# Patient Record
Sex: FEMALE | Race: BLACK OR AFRICAN AMERICAN | NOT HISPANIC OR LATINO | Employment: FULL TIME | URBAN - METROPOLITAN AREA
[De-identification: names, ages, dates, MRNs, and addresses within clinical notes are randomized per-mention and may not be internally consistent; named-entity substitution may affect disease eponyms.]

---

## 2022-12-01 ENCOUNTER — THERAPY VISIT (OUTPATIENT)
Dept: PHYSICAL THERAPY | Facility: CLINIC | Age: 52
End: 2022-12-01
Payer: OTHER MISCELLANEOUS

## 2022-12-01 DIAGNOSIS — M54.50 ACUTE RIGHT-SIDED LOW BACK PAIN: Primary | ICD-10-CM

## 2022-12-01 PROCEDURE — 97530 THERAPEUTIC ACTIVITIES: CPT | Mod: GP | Performed by: PHYSICAL THERAPIST

## 2022-12-01 PROCEDURE — 97161 PT EVAL LOW COMPLEX 20 MIN: CPT | Mod: GP | Performed by: PHYSICAL THERAPIST

## 2022-12-01 PROCEDURE — 97112 NEUROMUSCULAR REEDUCATION: CPT | Mod: GP | Performed by: PHYSICAL THERAPIST

## 2022-12-01 NOTE — PROGRESS NOTES
[facility] Initial Evaluation -- Lumbar    Date: December 1, 2022  Lorie Lewis is a 52 year old female with a Lumbar condition.   Referral:   Work mechanical stresses:   -   Employment status:  Not currently working  Leisure mechanical stresses: Not a regular exerciser  Functional disability score (ALEX/STarT Back):  See Flowsheet  VAS score (0-10): 7/10  Patient goals/expectations:  Be able to return to work.    HISTORY:    Present symptoms: R LBP with radiation to buttock, lateral thigh and lower leg into foot.  Pain quality (sharp/shooting/stabbing/aching/burning/cramping):  Throbbing   Paresthesia (yes/no):  Numbness R foot - Int    Present since (onset date):  10-.     Symptoms (improving/unchanging/worsening):  unchanging.     Symptoms commenced as a result of:  Fell at work - slipped on wet floor    Condition occurred in the following environment:   work     Symptoms at onset (back/thigh/leg): Back and Leg,  Constant symptoms (back/thigh/leg): Back and Leg  Intermittent symptoms (back/thigh/leg): Numbness    Symptoms are made worse with the following: Always Bending, Always Sitting, Always Rising, Always Walking and Time of day - No effect   Symptoms are made better with the following: Pain meds and ice    Disturbed sleep (yes/no):  Yes - pain with rolling over - 3-4 x/night Sleeping postures (prone/sup/side R/L):     Previous episodes (0/1-5/6-10/11+): 1 Year of first episode:     Previous history: May 2022 - fell landing on R side - did not have any pain LB or leg prior to current fall  Previous treatments: PT      Specific Questions:  Cough/Sneeze/Strain (pos/neg): pos  Bowel/Bladder (normal/abnormal): normal  Gait (normal/abnormal): normal  Medications (nil/NSAIDS/analg/steroids/anticoag/other):  Narcotics/Opiods  Medical allergies:  NKA  General health (excellent/good/fair/poor):  Excellent  Pertinent medical history:  None  Imaging  (None/Xray/MRI/Other):  None with recent fall  Recent or major surgery (yes/no):  no  Night pain (yes/no): yes with changing position in bed  Accidents (yes/no): no  Unexplained weight loss (yes/no): no  Barriers at home: none  Other red flags: none    EXAMINATION    Posture:   Sitting (good/fair/poor): poor  Standing (good/fair/poor):Fair-good  Lordosis (red/acc/normal): incr  Correction of posture (better/worse/no effect): some decr in pain    Lateral Shift (right/left/nil): nil  Relevant (yes/no):    Other Observations: Pt Ambulates very slowly with stiff legs.    Neurological:    Motor deficit:  NA  Reflexes:  NA  Sensory deficit:  NA  Dural signs:  Seated SLR with ankle DF:  R (+), L (-)    Movement Loss:   Royal Mod Min Nil Pain   Flexion  X   To knees (+++)   Extension X    R LB and buttock   Side Gliding R  X   R  LB and buttock   Side Gliding L  X   R LB and buttock     Test Movements:   During: produces, abolishes, increases, decreases, no effect, centralizing, peripheralizing   After: better, worse, no better, no worse, no effect, centralized, peripheralized    Pretest symptoms standing: Pain R LB, buttock and R leg to foot   Symptoms During Symptoms After ROM increased ROM decreased No Effect   FIS Increases    Worse         Rep FIS        EIS Increases    No Worse         Rep EIS Increases  R LB and buttock  No Worse      X     Static Tests:  Sitting slouched:  NA   Sitting erect:  NA  Standing slouched: NA  Standing erect:  NA  Lying prone in extension:  NA Long sitting:  NA    Other Tests:     Provisional Classification:  Derangement - Asymmetrical, unilateral, symptoms below knee    Principle of Management:  Education:  Posture - Neutral Spine, use of L-roll, affect of posture on spine/pain, no couch, recliner, or propping up on pillows in bed, specificity of exer, centralisation/peripheralisation, and HEP   Equipment provided:  None - Recommended using rolled towel for L-Roll whenever  sitting.  Mechanical therapy (Y/N):  Y   Extension principle:  Extension In Standing (back against counter) x10, 5x/day  Lateral Principle:    Flexion principle:    Other:      ASSESSMENT/PLAN:    Patient is a 52 year old female with lumbar complaints.    Patient has the following significant findings with corresponding treatment plan.                Diagnosis 1:  R LB and leg pain  Pain -  hot/cold therapy, manual therapy, self management, education, directional preference exercise and home program  Decreased ROM/flexibility - manual therapy, therapeutic exercise and home program  Decreased strength - therapeutic exercise, therapeutic activities and home program  Decreased function - therapeutic activities and home program  Impaired posture - neuro re-education and home program    Therapy Evaluation Codes:   1) History comprised of:   Personal factors that impact the plan of care:      Language.    Comorbidity factors that impact the plan of care are:      None.     Medications impacting care: Pain.  2) Examination of Body Systems comprised of:   Body structures and functions that impact the plan of care:      Lumbar spine.   Activity limitations that impact the plan of care are:      Bending, Sitting, Walking and rising from sitting.  3) Clinical presentation characteristics are:   Stable/Uncomplicated.  4) Decision-Making    Low complexity using standardized patient assessment instrument and/or measureable assessment of functional outcome.  Cumulative Therapy Evaluation is: Low complexity.    Previous and current functional limitations:  (See Goal Flow Sheet for this information)    Short term and Long term goals: (See Goal Flow Sheet for this information)     Communication ability:  Patient appears to be able to clearly communicate and understand verbal and written communication and follow directions correctly.  Treatment Explanation - The following has been discussed with the patient:   RX ordered/plan of  care  Anticipated outcomes  Possible risks and side effects  This patient would benefit from PT intervention to resume normal activities.   Rehab potential is good.    Frequency:  1 X week, once daily  Duration:  for 8 weeks  Discharge Plan:  Achieve all LTG.  Independent in home treatment program.  Reach maximal therapeutic benefit.    Please refer to the daily flowsheet for treatment today, total treatment time and time spent performing 1:1 timed codes.

## 2022-12-01 NOTE — LETTER
[facility]  [address] [place] [address]  [address]   [place] [address]  [phone]    2022    Re: Lorie Lewis   :   1970  MRN:  [id number]   REFERRING PHYSICIAN:   July [facility]    Date of Initial Evaluation:  2022  Visits:  Rxs Used: 1  Reason for Referral:  Acute right-sided low back pain    EVALUATION SUMMARY    [facility] Initial Evaluation -- Lumbar    Date: 2022  Lorie Lewis is a 52 year old female with a Lumbar condition.   Referral:   Work mechanical stresses:   -   Employment status:  Not currently working  Leisure mechanical stresses: Not a regular exerciser  Functional disability score (ALEX/STarT Back):  See Flowsheet  VAS score (0-10): 7/10  Patient goals/expectations:  Be able to return to work.    HISTORY:    Present symptoms: R LBP with radiation to buttock, lateral thigh and lower leg into foot.  Pain quality (sharp/shooting/stabbing/aching/burning/cramping):  Throbbing   Paresthesia (yes/no):  Numbness R foot - Int  Present since (onset date):  10-.     Symptoms (improving/unchanging/worsening):  unchanging.   Symptoms commenced as a result of:  Fell at work - slipped on wet floor    Condition occurred in the following environment:   work   Symptoms at onset (back/thigh/leg): Back and Leg,  Constant symptoms (back/thigh/leg): Back and Leg  Intermittent symptoms (back/thigh/leg): Numbness  Symptoms are made worse with the following: Always Bending, Always Sitting, Always Rising, Always Walking and Time of day - No effect   Symptoms are made better with the following: Pain meds and ice  Disturbed sleep (yes/no):  Yes - pain with rolling over - 3-4 x/night Sleeping postures (prone/sup/side R/L):   Previous episodes (0/1-5/6-10/11+): 1 Year of first episode:   Previous history: May 2022 - fell landing on R  side - did not have any pain LB or leg prior to current fall  Previous treatments: PT  Re: Lorie Lewis   :   1970    Specific Questions:  Cough/Sneeze/Strain (pos/neg): pos  Bowel/Bladder (normal/abnormal): normal  Gait (normal/abnormal): normal  Medications (nil/NSAIDS/analg/steroids/anticoag/other):  Narcotics/Opiods  Medical allergies:  NKA  General health (excellent/good/fair/poor):  Excellent  Pertinent medical history:  None  Imaging (None/Xray/MRI/Other):  None with recent fall  Recent or major surgery (yes/no):  no  Night pain (yes/no): yes with changing position in bed  Accidents (yes/no): no  Unexplained weight loss (yes/no): no  Barriers at home: none  Other red flags: none    EXAMINATION    Posture:   Sitting (good/fair/poor): poor  Standing (good/fair/poor):Fair-good  Lordosis (red/acc/normal): incr  Correction of posture (better/worse/no effect): some decr in pain    Lateral Shift (right/left/nil): nil  Relevant (yes/no):    Other Observations: Pt Ambulates very slowly with stiff legs.    Neurological:    Motor deficit:  NA  Reflexes:  NA  Sensory deficit:  NA  Dural signs:  Seated SLR with ankle DF:  R (+), L (-)    Movement Loss:   Royal Mod Min Nil Pain   Flexion  X   To knees (+++)   Extension X    R LB and buttock   Side Gliding R  X   R  LB and buttock   Side Gliding L  X   R LB and buttock     Test Movements:   During: produces, abolishes, increases, decreases, no effect, centralizing, peripheralizing   After: better, worse, no better, no worse, no effect, centralized, peripheralized              Re: Lorie Lewis   :   1970    Pretest symptoms standing: Pain R LB, buttock and R leg to foot   Symptoms During Symptoms After ROM increased ROM decreased No Effect   FIS Increases    Worse         Rep FIS        EIS Increases    No Worse         Rep EIS Increases  R LB and buttock  No Worse      X     Static Tests:  Sitting slouched:  NA   Sitting erect:  NA  Standing slouched:  NA  Standing erect:  NA  Lying prone in extension:  NA Long sitting:  NA    Other Tests:     Provisional Classification:  Derangement - Asymmetrical, unilateral, symptoms below knee    Principle of Management:  Education:  Posture - Neutral Spine, use of L-roll, affect of posture on spine/pain, no couch, recliner, or propping up on pillows in bed, specificity of exer, centralisation/peripheralisation, and HEP   Equipment provided:  None - Recommended using rolled towel for L-Roll whenever sitting.  Mechanical therapy (Y/N):  Y   Extension principle:  Extension In Standing (back against counter) x10, 5x/day  Lateral Principle:    Flexion principle:    Other:      ASSESSMENT/PLAN:    Patient is a 52 year old female with lumbar complaints.    Patient has the following significant findings with corresponding treatment plan.                Diagnosis 1:  R LB and leg pain  Pain -  hot/cold therapy, manual therapy, self management, education, directional preference exercise and home program  Decreased ROM/flexibility - manual therapy, therapeutic exercise and home program  Decreased strength - therapeutic exercise, therapeutic activities and home program  Decreased function - therapeutic activities and home program  Impaired posture - neuro re-education and home program    Therapy Evaluation Codes:   1) History comprised of:   Personal factors that impact the plan of care:     Language.    Comorbidity factors that impact the plan of care are:     None.     Medications impacting care: Pain.    Re: Lorie Lewis   :   1970    2) Examination of Body Systems comprised of:   Body structures and functions that impact the plan of care:     Lumbar spine.   Activity limitations that impact the plan of care are:     Bending, Sitting, Walking and rising from sitting.  3) Clinical presentation characteristics are:  Stable/Uncomplicated.  4) Decision-Making   Low complexity using standardized patient assessment instrument  and/or  measureable assessment of functional outcome.    Cumulative Therapy Evaluation is: Low complexity.    Previous and current functional limitations:  (See Goal Flow Sheet for this information)    Short term and Long term goals: (See Goal Flow Sheet for this information)     Communication ability:  Patient appears to be able to clearly communicate and understand verbal and written communication and follow directions correctly.  Treatment Explanation - The following has been discussed with the patient:   RX ordered/plan of care  Anticipated outcomes  Possible risks and side effects  This patient would benefit from PT intervention to resume normal activities.   Rehab potential is good.    Frequency:  1 X week, once daily  Duration:  for 8 weeks  Discharge Plan:  Achieve all LTG.  Independent in home treatment program.  Reach maximal therapeutic benefit.    Thank you for your referral.    INQUIRIES  Therapist: Rica Rivera PT  [address] [address]  Phone: [phone]  Fax: [phone]

## 2022-12-04 PROBLEM — M54.50 ACUTE RIGHT-SIDED LOW BACK PAIN: Status: ACTIVE | Noted: 2022-12-04

## 2022-12-05 ENCOUNTER — THERAPY VISIT (OUTPATIENT)
Dept: PHYSICAL THERAPY | Facility: CLINIC | Age: 52
End: 2022-12-05
Payer: OTHER MISCELLANEOUS

## 2022-12-05 DIAGNOSIS — M54.50 ACUTE RIGHT-SIDED LOW BACK PAIN: Primary | ICD-10-CM

## 2022-12-05 PROCEDURE — 97530 THERAPEUTIC ACTIVITIES: CPT | Mod: GP | Performed by: PHYSICAL THERAPIST

## 2022-12-05 PROCEDURE — 97110 THERAPEUTIC EXERCISES: CPT | Mod: GP | Performed by: PHYSICAL THERAPIST

## 2022-12-12 ENCOUNTER — THERAPY VISIT (OUTPATIENT)
Dept: PHYSICAL THERAPY | Facility: CLINIC | Age: 52
End: 2022-12-12
Payer: OTHER MISCELLANEOUS

## 2022-12-12 ENCOUNTER — TRANSCRIBE ORDERS (OUTPATIENT)
Dept: OTHER | Age: 52
End: 2022-12-12

## 2022-12-12 DIAGNOSIS — M54.50 ACUTE RIGHT-SIDED LOW BACK PAIN: Primary | ICD-10-CM

## 2022-12-12 PROCEDURE — 97140 MANUAL THERAPY 1/> REGIONS: CPT | Mod: GP

## 2022-12-12 PROCEDURE — 97110 THERAPEUTIC EXERCISES: CPT | Mod: GP

## 2022-12-20 ENCOUNTER — THERAPY VISIT (OUTPATIENT)
Dept: PHYSICAL THERAPY | Facility: CLINIC | Age: 52
End: 2022-12-20
Payer: OTHER MISCELLANEOUS

## 2022-12-20 DIAGNOSIS — M54.50 ACUTE RIGHT-SIDED LOW BACK PAIN: Primary | ICD-10-CM

## 2022-12-20 PROCEDURE — 97110 THERAPEUTIC EXERCISES: CPT | Mod: GP | Performed by: PHYSICAL THERAPIST

## 2022-12-20 PROCEDURE — 97530 THERAPEUTIC ACTIVITIES: CPT | Mod: GP | Performed by: PHYSICAL THERAPIST

## 2022-12-29 ENCOUNTER — THERAPY VISIT (OUTPATIENT)
Dept: PHYSICAL THERAPY | Facility: CLINIC | Age: 52
End: 2022-12-29
Payer: OTHER MISCELLANEOUS

## 2022-12-29 DIAGNOSIS — M54.50 ACUTE RIGHT-SIDED LOW BACK PAIN: Primary | ICD-10-CM

## 2022-12-29 PROCEDURE — 97110 THERAPEUTIC EXERCISES: CPT | Mod: GP | Performed by: PHYSICAL THERAPIST

## 2022-12-29 PROCEDURE — 97530 THERAPEUTIC ACTIVITIES: CPT | Mod: GP | Performed by: PHYSICAL THERAPIST

## 2023-05-26 ENCOUNTER — TRANSCRIBE ORDERS (OUTPATIENT)
Dept: OTHER | Age: 53
End: 2023-05-26

## 2023-05-26 DIAGNOSIS — M54.50 LOW BACK PAIN, UNSPECIFIED: Primary | ICD-10-CM

## 2023-05-26 DIAGNOSIS — G89.29 CHRONIC LOW BACK PAIN: ICD-10-CM

## 2023-05-26 DIAGNOSIS — M54.50 CHRONIC LOW BACK PAIN: ICD-10-CM

## 2023-06-27 ENCOUNTER — TRANSCRIBE ORDERS (OUTPATIENT)
Dept: OTHER | Age: 53
End: 2023-06-27

## 2023-06-27 ENCOUNTER — THERAPY VISIT (OUTPATIENT)
Dept: PHYSICAL THERAPY | Facility: CLINIC | Age: 53
End: 2023-06-27
Payer: OTHER MISCELLANEOUS

## 2023-06-27 DIAGNOSIS — M25.559 PAIN IN UNSPECIFIED HIP: Primary | ICD-10-CM

## 2023-06-27 DIAGNOSIS — M25.551 HIP PAIN, RIGHT: ICD-10-CM

## 2023-06-27 DIAGNOSIS — M54.50 ACUTE RIGHT-SIDED LOW BACK PAIN: Primary | ICD-10-CM

## 2023-06-27 PROCEDURE — 97112 NEUROMUSCULAR REEDUCATION: CPT | Mod: GP | Performed by: PHYSICAL THERAPIST

## 2023-06-27 PROCEDURE — 97161 PT EVAL LOW COMPLEX 20 MIN: CPT | Mod: GP | Performed by: PHYSICAL THERAPIST

## 2023-06-27 NOTE — PROGRESS NOTES
PHYSICAL THERAPY EVALUATION  Type of Visit: Evaluation    See electronic medical record for Abuse and Falls Screening details.    Subjective      Presenting condition or subjective complaint:    Date of onset:      Relevant medical history:     Dates & types of surgery:      Prior diagnostic imaging/testing results:       Prior therapy history for the same diagnosis, illness or injury:        Prior Level of Function   Living Environment  Social support:     Type of home:     Stairs to enter the home:         Ramp:     Stairs inside the home:         Help at home:    Equipment owned:       Employment:      Hobbies/Interests:      Patient goals for therapy:      Pain assessment: Pain present  Location: right sacroiliac joint and central low back pain/Ratin-7/10      Objective   LUMBAR SPINE EVALUATION  PAIN: Pain Level at Rest: 4/10  Pain is Worst: daytime  Pain is Relieved By: NSAIDs    POSTURE: Increased lumbar lordosis, genu recurvatum (B)   GAIT:      ROM:   (Degrees) Left AROM Left PROM  Right AROM Right PROM   Hip Flexion 90 with left anterior hip pulling  90 with left anterior hip pulling       Hip Extension 0      Hip Abduction 20  20    Hip Adduction       Hip Internal Rotation 18  15    Hip External Rotation 25  25 with end range tightness    Knee Flexion wnl  wnl    Knee Extension wnl  wnl    Lumbar Side  bend R: 25%. L: 50%     Lumbar Flexion 80% with right sacroiliac and central low back pain    Lumbar Extension 50% with right sacroiliac pain    Pain:   End feel:     PELVIC/SI SCREEN:  forward test positive standing and seated right, right inferior pubic symphysis, right posterior PSIS   STRENGTH: mmt: gluteus medius: 3+/5 (B), poor abdominal tone, faulty abdominal recruitment with compensatory sucking in of upper and lower abdominal   MYOTOMES: NA  DTR S:NA  CORD SIGNS: NA  NEURAL TENSION: NA  PALPATION:  Right SIJ, right piriformis, right hip adductors       Assessment & Plan   CLINICAL IMPRESSIONS    Medical Diagnosis:      Treatment Diagnosis:     Impression/Assessment: Patient is a 52 year old female with right hip  complaints.  The following significant findings have been identified: Decreased strength, Impaired muscle performance and instability. These impairments interfere with their ability to perform household mobility as compared to previous level of function.     Clinical Decision Making (Complexity):   Clinical Presentation: Stable/Uncomplicated  Clinical Presentation Rationale: based on medical and personal factors listed in PT evaluation  Clinical Decision Making (Complexity): Low complexity    PLAN OF CARE  Treatment Interventions:  Modalities: Cryotherapy, Hot Pack  Interventions: Manual Therapy, Neuromuscular Re-education, Therapeutic Exercise, Self-Care/Home Management    Long Term Goals            Frequency of Treatment:    Duration of Treatment:        Education Assessment:        Risks and benefits of evaluation/treatment have been explained.   Patient/Family/caregiver agrees with Plan of Care.     Evaluation Time:            Signing Clinician: Raysa Lopez, PT

## 2023-07-28 ENCOUNTER — HOSPITAL ENCOUNTER (EMERGENCY)
Facility: CLINIC | Age: 53
End: 2023-07-28